# Patient Record
Sex: FEMALE | Race: WHITE | Employment: UNEMPLOYED | URBAN - METROPOLITAN AREA
[De-identification: names, ages, dates, MRNs, and addresses within clinical notes are randomized per-mention and may not be internally consistent; named-entity substitution may affect disease eponyms.]

---

## 2018-04-19 ENCOUNTER — OFFICE VISIT (OUTPATIENT)
Dept: FAMILY MEDICINE CLINIC | Age: 23
End: 2018-04-19

## 2018-04-19 VITALS
WEIGHT: 111 LBS | HEIGHT: 57 IN | BODY MASS INDEX: 23.95 KG/M2 | SYSTOLIC BLOOD PRESSURE: 108 MMHG | DIASTOLIC BLOOD PRESSURE: 56 MMHG | HEART RATE: 60 BPM | TEMPERATURE: 98.5 F

## 2018-04-19 DIAGNOSIS — R07.89 COSTOCHONDRAL CHEST PAIN: ICD-10-CM

## 2018-04-19 DIAGNOSIS — Z13.9 ENCOUNTER FOR SCREENING: Primary | ICD-10-CM

## 2018-04-19 LAB
GLUCOSE POC: NORMAL MG/DL
HGB BLD-MCNC: 13.2 G/DL

## 2018-04-19 NOTE — PATIENT INSTRUCTIONS
Costochondritis: Care Instructions  Your Care Instructions  You have chest pain because the cartilage of your rib cage is inflamed. This problem is called costochondritis. This type of chest wall pain may last from days to weeks. It is not a heart problem. Sometimes costochondritis occurs with a cold or the flu, and other times the exact cause is not known. Follow-up care is a key part of your treatment and safety. Be sure to make and go to all appointments, and call your doctor if you are having problems. It's also a good idea to know your test results and keep a list of the medicines you take. How can you care for yourself at home? · Take medicines for pain and inflammation exactly as directed. ¨ If the doctor gave you a prescription medicine, take it as prescribed. ¨ If you are not taking a prescription pain medicine, ask your doctor if you can take an over-the-counter medicine. ¨ Do not take two or more pain medicines at the same time unless the doctor told you to. Many pain medicines have acetaminophen, which is Tylenol. Too much acetaminophen (Tylenol) can be harmful. · It may help to use a warm compress or heating pad (set on low) on your chest. You can also try alternating heat and ice. Put ice or a cold pack on the area for 10 to 20 minutes at a time. Put a thin cloth between the ice and your skin. · Avoid any activity that strains the chest area. As your pain gets better, you can slowly return to your normal activities. · Do not use tape, an elastic bandage, a \"rib belt,\" or anything else that restricts your chest wall motion. When should you call for help? Call 911 anytime you think you may need emergency care. For example, call if:  ? · You have new or different chest pain or pressure. This may occur with:  ¨ Sweating. ¨ Shortness of breath. ¨ Nausea or vomiting. ¨ Pain that spreads from the chest to the neck, jaw, or one or both shoulders or arms. ¨ Dizziness or lightheadedness.   ¨ A fast or uneven pulse. After calling 911, chew 1 adult-strength aspirin. Wait for an ambulance. Do not try to drive yourself. ? · You have severe trouble breathing. ?Call your doctor now or seek immediate medical care if:  ? · You have a fever or cough. ? · You have any trouble breathing. ? · Your chest pain gets worse. ? Watch closely for changes in your health, and be sure to contact your doctor if:  ? · Your chest pain continues even though you are taking anti-inflammatory medicine. ? · Your chest wall pain has not improved after 5 to 7 days. Where can you learn more? Go to http://jeb-ryan.info/. Enter K554 in the search box to learn more about \"Costochondritis: Care Instructions. \"  Current as of: March 20, 2017  Content Version: 11.4  © 8222-5283 skedge.me. Care instructions adapted under license by [employer] (which disclaims liability or warranty for this information). If you have questions about a medical condition or this instruction, always ask your healthcare professional. [place] [address] any warranty or liability for your use of this information. Costocondritis: Instrucciones de cuidado - [ Costochondritis: Care Instructions ]  Instrucciones de cuidado  Usted tiene dolor en el pecho porque el cartílago de saucedo caja torácica está inflamado. Emily problema se llama costocondritis. Emily tipo de dolor de la pared torácica puede durar desde varios días a semanas. No es un problema cardíaco. A veces la costocondritis ocurre con un resfriado o la gripe, y otras veces no se conoce la causa exacta. La atención de seguimiento es wesley parte clave de saucedo tratamiento y seguridad. Asegúrese de hacer y acudir a todas las citas, y llame a saucedo médico si está teniendo problemas. También es wesley buena idea saber los resultados de los exámenes y mantener wesley lista de los medicamentos que ele. Cómo puede cuidarse en el hogar?   · [employer] medicamentos para el dolor y la inflamación exactamente nicolas le fueron indicados. ¨ Si el médico le recetó un medicamento, tómelo según las indicaciones. ¨ Si no está tomando un analgésico (medicamento para el dolor) recetado, pregúntele a saucedo médico si puede мария rosalie de [employer]. ¨ No tome dos o más analgésicos al [employer], a menos que el médico se lo haya indicado. Muchos analgésicos contienen acetaminofén, es decir, Tylenol. El exceso de acetaminofén (Tylenol) puede ser dañino. · Usar welsey compresa tibia o wesley almohadilla térmica (a temperatura baja) sobre el pecho puede ayudar. También puede tratar de [place] [employer] calor y hielo. Colóquese hielo o wesley compresa fría en la mila denita 10 a 20 minutos cada vez. Póngase un paño huff entre el hielo y la piel. · Evite cualquier actividad en la que tenga que esforzar la mila del pecho. A medida que saucedo dolor mejore, puede volver poco a poco a terry actividades normales. · No use cinta adhesiva, un vendaje elástico, un \"cinturón para las costillas\", ni ninguna otra cosa que restrinja el movimiento de la pared torácica. Cuándo debe pedir ayuda? Llame al 911 en cualquier momento que considere que necesita atención de emergencia. Por ejemplo, llame si:  ? · Siente nuevo o diferente dolor u opresión en el pecho. [place] podría ocurrir junto con:  ¨ Sudoración. ¨ Falta de aire. ¨ Náuseas o vómito. ¨ Dolor que se extiende del pecho al adriana, la [place], o hacia rosalie o ambos hombros o [id number]. ¨ Mareos o aturdimiento. ¨ Pulso rápido o irregular. Después de llamar al 911, mastique 1 aspirina para adultos. Espere a la ambulancia. No trate de conducir usted mismo un automóvil. ? · [address] dificultades para respirar. ? Llame a saucedo médico ahora mismo o busque atención médica inmediata si:  ? · Tiene fiebre o tos. ? · Tiene cualquier dificultad para respirar. ? · El dolor en el pecho empeora.    ?Preste especial atención a los [facility] y asegúrese de comunicarse con saucedo médico si:  ? · El dolor de pecho continúa aunque esté tomando medicamentos antiinflamatorios. ? · El dolor de la pared torácica no mcdonald carline después de 5 a 7 días. Dónde puede encontrar más información en inglés? [place] a http://jeb-ryan.info/. Shon Spotted V884 en la búsqueda para aprender más acerca de \"Costocondritis: Instrucciones de cuidado - [ Costochondritis: Care Instructions ]. \"  Revisado: 20 Raghu Duarte 2017  Versión del contenido: 11.4  © 3588-2806 Healthwise, Incorporated. Las instrucciones de cuidado fueron adaptadas bajo licencia por [employer] (which disclaims liability or warranty for this information). Si usted tiene [place] afección médica o sobre estas instrucciones, siempre pregunte a saucedo profesional de rachid. Healthwise, Incorporated niega toda garantía o responsabilidad por saucedo uso de esta información.

## 2018-04-19 NOTE — PROGRESS NOTES
Results for orders placed or performed in visit on 04/19/18   AMB POC GLUCOSE BLOOD, BY GLUCOSE MONITORING DEVICE   Result Value Ref Range    Glucose POC NF 71 mg/dL   AMB POC HEMOGLOBIN (HGB)   Result Value Ref Range    Hemoglobin (POC) 13.2

## 2021-03-24 ENCOUNTER — HOSPITAL ENCOUNTER (EMERGENCY)
Age: 26
Discharge: HOME | End: 2021-03-24
Attending: EMERGENCY MEDICINE

## 2021-03-24 ENCOUNTER — VIRTUAL VISIT (OUTPATIENT)
Dept: FAMILY MEDICINE CLINIC | Age: 26
End: 2021-03-24

## 2021-03-24 VITALS
OXYGEN SATURATION: 97 % | BODY MASS INDEX: 26.16 KG/M2 | WEIGHT: 121.25 LBS | DIASTOLIC BLOOD PRESSURE: 70 MMHG | RESPIRATION RATE: 18 BRPM | HEART RATE: 98 BPM | TEMPERATURE: 98.4 F | HEIGHT: 57 IN | SYSTOLIC BLOOD PRESSURE: 121 MMHG

## 2021-03-24 DIAGNOSIS — L05.91 INFECTED PILONIDAL CYST: Primary | ICD-10-CM

## 2021-03-24 DIAGNOSIS — Z71.9 COUNSELED BY NURSE: Primary | ICD-10-CM

## 2021-03-24 PROCEDURE — 75810000462 HC INC/DRN PILONIDAL CYST SIMPLE LVL 1 5051

## 2021-03-24 PROCEDURE — 99284 EMERGENCY DEPT VISIT MOD MDM: CPT

## 2021-03-24 PROCEDURE — 99283 EMERGENCY DEPT VISIT LOW MDM: CPT

## 2021-03-24 PROCEDURE — 74011000250 HC RX REV CODE- 250: Performed by: EMERGENCY MEDICINE

## 2021-03-24 RX ORDER — LIDOCAINE HYDROCHLORIDE AND EPINEPHRINE 10; 10 MG/ML; UG/ML
1.5 INJECTION, SOLUTION INFILTRATION; PERINEURAL ONCE
Status: COMPLETED | OUTPATIENT
Start: 2021-03-24 | End: 2021-03-24

## 2021-03-24 RX ADMIN — LIDOCAINE HYDROCHLORIDE,EPINEPHRINE BITARTRATE 15 MG: 10; .01 INJECTION, SOLUTION INFILTRATION; PERINEURAL at 20:48

## 2021-03-24 NOTE — ED TRIAGE NOTES
Patient presents to ED for c/o back pain x 1 week. Denies any injury or radiation of pain. Has taken antibiotics (ampicillin) yesterday to help with the pain. Reports previous operation on back approx 6 years ago. (Per , believes pt is referring to pilonidal cyst?)     Information obtained via  #[id number].

## 2021-03-24 NOTE — PROGRESS NOTES
Evelio Herzog assisted with the intake. 6 months a go took medication for the pain. The pain went away. Saw a Dr last Monday. - Dr Margarito Nugent? ? Does not know where this is. It is like an urgent care. She got medicine that is an antibiotic and Amoxicillin/ ?? Is 875mg / 175 mg? [place]. She stated she only took the medication last nght and this morning at 6am. Was not feeling better was feeling worse so she stopped taking it. The pt stated she can not walk and sit and is getting worse. She is feeling bad. The pt then told the nurse that the other dr she had seen that gave he rthe antibiotic had told her if the medication dd not work and if she got worse she would need to go to the hospital because she probably would need surgery. The pt was told then that is what she should have done. Because this is a VV APPT the pt will need to be seen in person. The pt was advised to go to the Hospital now. The pt was given [facility] address and told about the Care Card application process for FA. The provider was notified that this nurse advised th ept to go to the ED. The provider did not speak with the pt tonight. No VV appt was done with the provider.   Diaz Williamson RN

## 2021-03-25 ENCOUNTER — TELEPHONE (OUTPATIENT)
Dept: FAMILY MEDICINE CLINIC | Age: 26
End: 2021-03-25

## 2021-03-25 NOTE — ED PROVIDER NOTES
81-year-old female with a history of a pilonidal cyst presents with a chief complaint of low back pain in the area of a recurrent pilonidal cyst.  The patient has had surgery to remove a pilonidal cyst in [facility] several years ago. She has not had any fevers. She has been on antibiotic recently which has not helped. She denies any other symptoms. A  was used. Past Medical History:   Diagnosis Date    Ovarian cyst     right side       No past surgical history on file.       Family History:   Problem Relation Age of Onset    Diabetes Father        Social History     Socioeconomic History    Marital status: SINGLE     Spouse name: Not on file    Number of children: Not on file    Years of education: Not on file    Highest education level: Not on file   Occupational History    Not on file   Social Needs    Financial resource strain: Not on file    Food insecurity     Worry: Not on file     Inability: Not on file    Transportation needs     Medical: Not on file     Non-medical: Not on file   Tobacco Use    Smoking status: Never Smoker    Smokeless tobacco: Never Used   Substance and Sexual Activity    Alcohol use: No    Drug use: No    Sexual activity: Not Currently     Partners: Male     Birth control/protection: Implant   Lifestyle    Physical activity     Days per week: Not on file     Minutes per session: Not on file    Stress: Not on file   Relationships    Social connections     Talks on phone: Not on file     Gets together: Not on file     Attends [employer] service: Not on file     Active member of club or organization: Not on file     Attends meetings of clubs or organizations: Not on file     Relationship status: Not on file    Intimate partner violence     Fear of current or ex partner: Not on file     Emotionally abused: Not on file     Physically abused: Not on file     Forced sexual activity: Not on file   Other Topics Concern    Not on file   Social History Narrative    Not on file         ALLERGIES: Patient has no known allergies. Review of Systems   Constitutional: Negative for fever. HENT: Negative for rhinorrhea. Respiratory: Negative for shortness of breath. Cardiovascular: Negative for chest pain. Gastrointestinal: Negative for abdominal pain. Genitourinary: Negative for dysuria. Musculoskeletal: Negative for back pain. Skin: Positive for wound. Neurological: Negative for headaches. Psychiatric/Behavioral: Negative for confusion. Vitals:    03/24/21 1918 03/24/21 2130 03/24/21 2130   BP: 121/70     Pulse: (!) 114 98    Resp: 18 18    Temp: 98.4 °F (36.9 °C)     SpO2: 97% 98% 97%   Weight: 55 kg (121 lb 4.1 oz)     Height: 4' 9.09\" (1.45 m)              Physical Exam  Vitals signs and nursing note reviewed. Constitutional:       General: She is not in acute distress. Appearance: Normal appearance. She is not ill-appearing, toxic-appearing or diaphoretic. HENT:      Head: Normocephalic and atraumatic. Eyes:      Extraocular Movements: Extraocular movements intact. Neck:      Musculoskeletal: Normal range of motion. Cardiovascular:      Rate and Rhythm: Normal rate and regular rhythm. Pulses: Normal pulses. Heart sounds: Normal heart sounds. No murmur. No friction rub. No gallop. Pulmonary:      Effort: Pulmonary effort is normal. No respiratory distress. Breath sounds: Normal breath sounds. No wheezing or rales. Abdominal:      General: Abdomen is flat. Bowel sounds are normal. There is no distension. Palpations: Abdomen is soft. Tenderness: There is no abdominal tenderness. Genitourinary:     Comments: Pilonidal scar and pilonidal cyst with mild surrounding erythema. Musculoskeletal: Normal range of motion. Skin:     General: Skin is warm and dry. Neurological:      Mental Status: She is alert and oriented to person, place, and time.    Psychiatric:         Mood and Affect: Mood normal.          MDM  Number of Diagnoses or Management Options  Infected pilonidal cyst  Diagnosis management comments: Patient presents with a pilonidal cyst.   was used for the history and physical exam.  I did consent her with an  for a pilonidal cyst drainage as I do feel that she likely has an infected pilonidal cyst.  She has had surgery for this in the past and I will refer her to surgery here in the [address]. The cyst was drained per procedure note below. I see no indication for antibiotics as she has no evidence of surrounding cellulitis. Patient is comfortable and agreeable to plan of care and aware of return precautions. I&D Abcess Complex    Date/Time: 3/24/2021 11:49 PM  Performed by: Dimitrios Ritchie MD  Authorized by: Dimitrios Ritchie MD     Consent:     Consent obtained:  Verbal    Consent given by:  Patient    Risks discussed:  Bleeding, incomplete drainage, infection and pain    Alternatives discussed:  Alternative treatment  Location:     Type:  Pilonidal cyst  Pre-procedure details:     Skin preparation:  Antiseptic wash  Anesthesia (see MAR for exact dosages): Anesthesia method:  Local infiltration    Local anesthetic:  Lidocaine 1% WITH epi  Procedure type:     Complexity:  Complex  Procedure details:     Needle aspiration: no      Incision types:  Single straight    Incision depth:  Submucosal    Scalpel blade:  11    Wound management:  Probed and deloculated, irrigated with saline and extensive cleaning    Drainage:  Bloody and purulent    Drainage amount: Moderate    Wound treatment:  Wound left open    Packing materials:  None  Post-procedure details:     Patient tolerance of procedure: Tolerated well, no immediate complications  Comments:      Procedure was performed under ultrasound guidance.

## 2021-03-25 NOTE — TELEPHONE ENCOUNTER
Tc from the pt she had been on the schedule for the provider in last nights VV clinic. While doing intake on the pt the pt had stated she wa shaving trouble sitting and walking and lying down from the area of her back that she had a cyst removed approx 6 yrs ago. She has been to the Dr Monday and he gave her antibiotics and told her if she does not improve or gets worse she needed to go to the ED. The pt was told that she should do exactly that then. The appt today is VV and the provider if she waited for him to call would tell her to just go to the ED. The pt was advised to go to ED and to call this nurse back the ext day so we could schedule her an appt with the provider in case she is in need of surgery or needs further care. The pt went to the ED, and called this nurse this am. The pt stated they lanced the cyst and drained it and told her she would need to see a surgeon ASAP. The pt was told someone would call her back with an follow up post ED appt soon. The pt verbalized understanding. A message was sent to the front office to please schedule the pt for the Post ED appt.  Sunita Boykin RN

## 2021-03-29 ENCOUNTER — OFFICE VISIT (OUTPATIENT)
Dept: FAMILY MEDICINE CLINIC | Age: 26
End: 2021-03-29

## 2021-03-29 VITALS
HEART RATE: 66 BPM | BODY MASS INDEX: 26.27 KG/M2 | HEIGHT: 56 IN | TEMPERATURE: 98.2 F | DIASTOLIC BLOOD PRESSURE: 68 MMHG | WEIGHT: 116.8 LBS | OXYGEN SATURATION: 98 % | SYSTOLIC BLOOD PRESSURE: 100 MMHG

## 2021-03-29 DIAGNOSIS — L05.91 PILONIDAL CYST: Primary | ICD-10-CM

## 2021-03-29 DIAGNOSIS — Z59.9 ECONOMIC PROBLEM: ICD-10-CM

## 2021-03-29 PROCEDURE — 99213 OFFICE O/P EST LOW 20 MIN: CPT | Performed by: FAMILY MEDICINE

## 2021-03-29 SDOH — ECONOMIC STABILITY - INCOME SECURITY: PROBLEM RELATED TO HOUSING AND ECONOMIC CIRCUMSTANCES, UNSPECIFIED: Z59.9

## 2021-03-29 NOTE — PROGRESS NOTES
I called patient today with [facility] [employer]  # [phone] as . I have printed AVS and reviewed it with patient today. Explained to patient  that Jesus Ernandez or Leanne South will be meeting with them to complete application for Access Now referral to see general surgery. They know that they will get a phone call to schedule this appointment. I gave patient an application for financial assistance as she has concerns/questions about her ED visit and bills. I have asked patient to also explain to Vestor when they call that she has questions about this process as well. Dillan Bedolla RN

## 2021-03-29 NOTE — PROGRESS NOTES
HISTORY OF PRESENT ILLNESS  Leanne Gutierrez is a 26 y.o. female. HPI  Patient states she went to the hospital and was diagnosed with infected pilonidal cyst.  She had it treated in 2015 in [facility].  She feels better now, she was advise to get an appointment with general surgery to have definite treatment of the cyst.  Review of Systems   Constitutional: Negative for chills, fever and weight loss. Respiratory: Negative for cough and hemoptysis. Cardiovascular: Negative for chest pain, palpitations and orthopnea. Gastrointestinal: Negative for diarrhea, heartburn and nausea. Skin:        Skin lesions sacral area   /68 (BP 1 Location: Left arm, BP Patient Position: Sitting)   Pulse 66   Temp 98.2 °F (36.8 °C) (Temporal)   Ht 4' 8.22\" (1.428 m)   Wt 116 lb 12.8 oz (53 kg)   LMP 03/24/2021 (Exact Date)   SpO2 98%   BMI 25.98 kg/m²   Physical Exam  Constitutional:       General: She is not in acute distress. HENT:      Right Ear: Tympanic membrane normal.      Left Ear: Tympanic membrane normal.      Nose: Nose normal. No congestion. Mouth/Throat:      Mouth: Mucous membranes are moist.      Pharynx: No oropharyngeal exudate or posterior oropharyngeal erythema. Eyes:      General:         Right eye: No discharge. Left eye: No discharge. Pupils: Pupils are equal, round, and reactive to light. Neck:      Musculoskeletal: Normal range of motion. No neck rigidity. Cardiovascular:      Rate and Rhythm: Normal rate and regular rhythm. Pulses: Normal pulses. Heart sounds: No murmur. Pulmonary:      Effort: Pulmonary effort is normal. No respiratory distress. Breath sounds: Normal breath sounds. No wheezing or rhonchi. Abdominal:      General: Bowel sounds are normal. There is no distension. Palpations: Abdomen is soft. Tenderness: There is no abdominal tenderness. Hernia: No hernia is present. Musculoskeletal: Normal range of motion. General: No swelling or tenderness. Skin:     Comments: There is a 2 cm palpable lump, sacral area   Neurological:      General: No focal deficit present. Mental Status: She is alert. ASSESSMENT and PLAN  Diagnoses and all orders for this visit:    1.  Pilonidal cyst  -     REFERRAL TO GENERAL SURGERY    2. Economic problem  -     REFERRAL TO SOCIAL WORK      26year old with a pilonidal cyst, recently abscessed, I+D in the ED, we will refer to general surgery for definite treatment  Follow up as needed

## 2021-03-29 NOTE — PROGRESS NOTES
Coordination of Care  1. Have you been to the ER, urgent care clinic since your last visit? Hospitalized since your last visit? Yes When: 3/24/21- [address]    2. Have you seen or consulted any other health care providers outside of the [address] [place] [address] since your last visit? Include any pap smears or colon screening. No    Does the patient need refills? NO    Learning Assessment Complete?  yes  Depression Screening complete in the past 12 months? yes

## 2021-04-02 ENCOUNTER — TELEPHONE (OUTPATIENT)
Dept: FAMILY MEDICINE CLINIC | Age: 26
End: 2021-04-02

## 2021-04-02 ENCOUNTER — OFFICE VISIT (OUTPATIENT)
Dept: FAMILY MEDICINE CLINIC | Age: 26
End: 2021-04-02

## 2021-04-02 DIAGNOSIS — Z71.89 COUNSELING AND COORDINATION OF CARE: Primary | ICD-10-CM

## 2021-04-02 PROCEDURE — 99080 SPECIAL REPORTS OR FORMS: CPT | Performed by: PHYSICIAN ASSISTANT

## 2021-04-02 NOTE — PROGRESS NOTES
V.V. Patient called OW after receiving vm. Financial screening started. An appt was made for 4/16/21 at 1:45pm. OW screened patient for Yuliana Shelley and she replied NO to all questions. Pending POI.

## 2021-04-02 NOTE — TELEPHONE ENCOUNTER
OW called patient to assist with AN financial screening. Unable to speak with patient, OW left a vm asking patient to call back.

## 2021-04-16 ENCOUNTER — OFFICE VISIT (OUTPATIENT)
Dept: FAMILY MEDICINE CLINIC | Age: 26
End: 2021-04-16

## 2021-04-16 DIAGNOSIS — Z71.89 COUNSELING AND COORDINATION OF CARE: Primary | ICD-10-CM

## 2021-04-16 PROCEDURE — 99080 SPECIAL REPORTS OR FORMS: CPT | Performed by: PHYSICIAN ASSISTANT

## 2021-04-16 NOTE — PROGRESS NOTES
OW met patient. Patient signed forms. AN application was completed. OW instructed patient to call AN on or after 4/30/21. Patient also needs assistance with bills.  An appt was made for next 4/22/21 at 1:45pm.

## 2021-04-22 ENCOUNTER — OFFICE VISIT (OUTPATIENT)
Dept: FAMILY MEDICINE CLINIC | Age: 26
End: 2021-04-22

## 2021-04-22 DIAGNOSIS — Z71.89 COUNSELING AND COORDINATION OF CARE: Primary | ICD-10-CM

## 2021-04-22 PROCEDURE — 99080 SPECIAL REPORTS OR FORMS: CPT | Performed by: PHYSICIAN ASSISTANT

## 2021-04-22 NOTE — PROGRESS NOTES
BANG met with patient and assisted her with Care Card application. It was completed. BANG mailed it to [facility] FA and provided written instructions in Icelandic to patient on what to do next. Patient verbalized understanding.

## 2021-05-10 ENCOUNTER — OFFICE VISIT (OUTPATIENT)
Dept: SURGERY | Age: 26
End: 2021-05-10
Payer: SUBSIDIZED

## 2021-05-10 VITALS
HEART RATE: 72 BPM | RESPIRATION RATE: 16 BRPM | DIASTOLIC BLOOD PRESSURE: 75 MMHG | SYSTOLIC BLOOD PRESSURE: 106 MMHG | TEMPERATURE: 98.9 F | OXYGEN SATURATION: 98 % | HEIGHT: 56 IN | BODY MASS INDEX: 26.99 KG/M2 | WEIGHT: 120 LBS

## 2021-05-10 DIAGNOSIS — Z98.890 HISTORY OF SURGICAL REMOVAL OF PILONIDAL CYST: Primary | ICD-10-CM

## 2021-05-10 PROCEDURE — 99202 OFFICE O/P NEW SF 15 MIN: CPT | Performed by: SURGERY

## 2021-05-10 RX ORDER — AMOXICILLIN AND CLAVULANATE POTASSIUM 875; 125 MG/1; MG/1
1 TABLET, FILM COATED ORAL EVERY 12 HOURS
Qty: 10 TAB | Refills: 0 | Status: SHIPPED | OUTPATIENT
Start: 2021-05-10 | End: 2021-05-15

## 2021-05-10 NOTE — PROGRESS NOTES
Shanna Robertson is a 26 y.o. female who is referred by Dr. Lily Rubi for further evaluation of a pilonidal abscess. Information obtained from patient via blue phone  and review of chart. Ms. Niraj Lynn tells me that she is s/p pilonidal cystectomy in [facility] many years ago. Doing well until March, 2021 when she began experiencing lower back pain. Seen in the ER where she was found to have a recurrent pilonidal abscess which was drained. No antibiotics were prescribed at that time. Ms. Niraj Lynn has been doing fairly well since then. Still c/o back pain. No fevers or chills. Nausea but no emesis. No drainage or bleeding from wound. She has otherwise been in her usual state of health. Past Medical History:   Diagnosis Date    History of surgical removal of pilonidal cyst 5/10/2021    Ovarian cyst     right side     History reviewed. No pertinent surgical history. Family History   Problem Relation Age of Onset    Diabetes Father      Social History     Socioeconomic History    Marital status: SINGLE     Spouse name: Not on file    Number of children: Not on file    Years of education: Not on file    Highest education level: Not on file   Tobacco Use    Smoking status: Never Smoker    Smokeless tobacco: Never Used   Substance and Sexual Activity    Alcohol use: Yes     Frequency: Monthly or less     Comment: occa    Drug use: No    Sexual activity: Not Currently     Partners: Male     Birth control/protection: Implant     Review of systems negative except as noted. Review of Systems   Constitutional: Negative for chills and fever. Gastrointestinal: Negative for nausea and vomiting. Musculoskeletal: Positive for back pain. Physical Exam  Vitals signs reviewed. Constitutional:       Appearance: Normal appearance. She is normal weight. HENT:      Head: Normocephalic and atraumatic. Eyes:      General: No scleral icterus. Neck:      Musculoskeletal: Neck supple. Cardiovascular:      Rate and Rhythm: Normal rate and regular rhythm. Pulmonary:      Effort: Pulmonary effort is normal.      Breath sounds: Normal breath sounds. Abdominal:      General: There is no distension. Palpations: Abdomen is soft. Tenderness: There is no abdominal tenderness. Musculoskeletal: Normal range of motion. Skin:     Comments: Well healed scar in intergluteal cleft. Tender. No associated purulent drainage, induration or cellulitis. No apparent recurrent pilonidal disease. Neurological:      General: No focal deficit present. Mental Status: She is alert. ASSESSMENT and PLAN  Ms. Jamison Rothman is a 27 yo female doing well following drainage of a pilonidal abscess. Reassured Ms. Jamison Rothman, via the blue phone , that she is doing well and that there is no apparent recurrent pilonidal abscess. Do not believe that there is an acute indication for surgical intervention at this time. Will try a 5 day course of Augmentin 875/125 q 12 hours for five days. Activity as tolerated. Will see in one more week or earlier if need be. Discussed plan with Ms. Jamison Rothman, via the blue phone , and she is agreeable.       CC: Kris Pereira MD

## 2021-05-10 NOTE — PROGRESS NOTES
1. Have you been to the ER, urgent care clinic since your last visit? Hospitalized since your last visit? No    2. Have you seen or consulted any other health care providers outside of the [address] [place] [address] since your last visit? Include any pap smears or colon screening.  No

## 2021-06-07 ENCOUNTER — OFFICE VISIT (OUTPATIENT)
Dept: SURGERY | Age: 26
End: 2021-06-07
Payer: SUBSIDIZED

## 2021-06-07 VITALS
OXYGEN SATURATION: 99 % | RESPIRATION RATE: 16 BRPM | HEIGHT: 56 IN | BODY MASS INDEX: 26.1 KG/M2 | SYSTOLIC BLOOD PRESSURE: 96 MMHG | DIASTOLIC BLOOD PRESSURE: 86 MMHG | TEMPERATURE: 98.1 F | WEIGHT: 116 LBS | HEART RATE: 71 BPM

## 2021-06-07 DIAGNOSIS — Z98.890 HISTORY OF SURGICAL REMOVAL OF PILONIDAL CYST: Primary | ICD-10-CM

## 2021-06-07 PROCEDURE — 99212 OFFICE O/P EST SF 10 MIN: CPT | Performed by: SURGERY

## 2021-06-07 NOTE — PROGRESS NOTES
Norman Marin is a 26 y.o. female who returns for follow up of a pilonidal abscess. Information obtained from patient via blue phone . Ms. Jass Chan was last seen on May 10, 2021 for evaluation of a pilonidal abscess. Doing fairly well since then. Still experiencing pain in intergluteal cleft. No associated drainage. No fevers or chills. No nausea or vomitting. She has otherwise been in her usual state of health. Past Medical History:   Diagnosis Date    History of surgical removal of pilonidal cyst 5/10/2021    Ovarian cyst     right side     History reviewed. No pertinent surgical history. Family History   Problem Relation Age of Onset    Diabetes Father      Social History     Socioeconomic History    Marital status: SINGLE     Spouse name: Not on file    Number of children: Not on file    Years of education: Not on file    Highest education level: Not on file   Tobacco Use    Smoking status: Never Smoker    Smokeless tobacco: Never Used   Substance and Sexual Activity    Alcohol use: Yes     Comment: occa    Drug use: No    Sexual activity: Not Currently     Partners: Male     Birth control/protection: Implant     Social Determinants of Health     Financial Resource Strain:     Difficulty of Paying Living Expenses:    Food Insecurity:     Worried About Running Out of Food in the Last Year:     [address] in the Last Year:    Transportation Needs:     Lack of Transportation (Medical):      Lack of Transportation (Non-Medical):    Physical Activity:     Days of Exercise per Week:     Minutes of Exercise per Session:    Stress:     Feeling of Stress :    Social Connections:     Frequency of Communication with Friends and Family:     Frequency of Social Gatherings with Friends and Family:     Attends Yazidi Services:     Active Member of Clubs or Organizations:     Attends Club or Organization Meetings:     Marital Status:      Review of systems negative except as noted. Review of Systems   Constitutional: Negative for chills and fever. Gastrointestinal: Negative for nausea and vomiting. Musculoskeletal:        Discomfort in intergluteal cleft. Physical Exam  Vitals reviewed. Constitutional:       General: She is not in acute distress. Appearance: Normal appearance. She is normal weight. HENT:      Head: Normocephalic and atraumatic. Cardiovascular:      Rate and Rhythm: Normal rate and regular rhythm. Pulmonary:      Effort: Pulmonary effort is normal.      Breath sounds: Normal breath sounds. Abdominal:      General: There is no distension. Palpations: Abdomen is soft. Tenderness: There is no abdominal tenderness. Musculoskeletal:         General: Normal range of motion. Skin:     Comments: Well healed scar in intergluteal cleft. No sinus openings in in intergluteal cleft. No drainage or associated cellulitis or induration. Neurological:      General: No focal deficit present. Mental Status: She is alert. ASSESSMENT and PLAN  Ms. Jamison Rothman is a 25 yo female s/p pilonidal cystectomy in the past with no apparent active infection or recurrent disease. Reassured Ms. Jamison Rothman, via the blue phone , that she is doing well and that at this point in time there is no apparent infection or recurrent disease. Do not believe that there is an acute indication for abx therapy or surgical intervention at this time. Activity as tolerated. Follow up with Dr. Court Frazier as scheduled. Will see in two more weeks or earlier if need be. Discussed plan with Ms. Jamison Rothman, via the blue phone , and she is agreeable.     CC: Kris Pereira MD

## 2021-06-07 NOTE — PROGRESS NOTES
1. Have you been to the ER, urgent care clinic since your last visit? Hospitalized since your last visit? No    2. Have you seen or consulted any other health care providers outside of the [address] [place] [address] since your last visit? Include any pap smears or colon screening.  No

## 2022-03-19 PROBLEM — Z98.890 HISTORY OF SURGICAL REMOVAL OF PILONIDAL CYST: Status: ACTIVE | Noted: 2021-05-10

## 2022-09-01 ENCOUNTER — TRANSCRIBE ORDER (OUTPATIENT)
Dept: SCHEDULING | Age: 27
End: 2022-09-01

## 2022-09-01 DIAGNOSIS — R10.2 PELVIC PAIN IN FEMALE: Primary | ICD-10-CM

## 2022-09-08 ENCOUNTER — HOSPITAL ENCOUNTER (OUTPATIENT)
Dept: ULTRASOUND IMAGING | Age: 27
Discharge: HOME | End: 2022-09-08

## 2022-09-08 DIAGNOSIS — R10.2 PELVIC PAIN IN FEMALE: ICD-10-CM

## 2022-09-08 PROCEDURE — 76856 US EXAM PELVIC COMPLETE: CPT

## 2022-09-08 PROCEDURE — 76830 TRANSVAGINAL US NON-OB: CPT

## 2023-04-21 DIAGNOSIS — R10.2 PELVIC PAIN IN FEMALE: Primary | ICD-10-CM

## 2023-10-12 ENCOUNTER — TELEPHONE (OUTPATIENT)
Age: 28
End: 2023-10-12

## 2023-10-12 NOTE — TELEPHONE ENCOUNTER
----- Message from Delmis Kraft sent at 10/12/2023 10:43 AM EDT -----  Subject: Message to Provider    QUESTIONS  Information for Provider? Dana from the Prenatal center states that the   prt had a appt at [facility] at 8:45 missed appt offered to reschedule pt   declined   ---------------------------------------------------------------------------  --------------  Charles Manchester Braulio  [phone]; OK to leave message on voicemail  ---------------------------------------------------------------------------  --------------  SCRIPT ANSWERS  Relationship to Patient? Covered Entity  Covered Entity Type? Hospital?  Representative Name?  [employer]

## 2023-10-13 ENCOUNTER — ROUTINE PRENATAL (OUTPATIENT)
Age: 28
End: 2023-10-13
Payer: MEDICAID

## 2023-10-13 VITALS
SYSTOLIC BLOOD PRESSURE: 102 MMHG | WEIGHT: 143 LBS | OXYGEN SATURATION: 100 % | BODY MASS INDEX: 30.85 KG/M2 | DIASTOLIC BLOOD PRESSURE: 68 MMHG | HEART RATE: 82 BPM | TEMPERATURE: 97.9 F

## 2023-10-13 DIAGNOSIS — O09.90 SUPERVISION OF HIGH RISK PREGNANCY, ANTEPARTUM: Primary | ICD-10-CM

## 2023-10-13 DIAGNOSIS — O99.019 ANTEPARTUM ANEMIA: ICD-10-CM

## 2023-10-13 PROCEDURE — PBSHW TDAP, BOOSTRIX, (AGE 10 YRS+), IM: Performed by: FAMILY MEDICINE

## 2023-10-13 PROCEDURE — 90715 TDAP VACCINE 7 YRS/> IM: CPT | Performed by: FAMILY MEDICINE

## 2023-10-13 PROCEDURE — 0502F SUBSEQUENT PRENATAL CARE: CPT | Performed by: FAMILY MEDICINE

## 2023-10-13 RX ORDER — FERROUS SULFATE 325(65) MG
325 TABLET ORAL EVERY OTHER DAY
Qty: 30 TABLET | Refills: 1 | Status: SHIPPED | OUTPATIENT
Start: 2023-10-13

## 2023-10-13 NOTE — PROGRESS NOTES
Chief Complaint   Patient presents with    Routine Prenatal Visit     C/o back pain      Spine pain - sciatica before pregnancy but no pain down legs now, no history of scoliosis, hard to twist sideways  hard to sit, sometimes feels like going to fall, no pain in gluteal region, pain with walking, changing positions  when pain is really strong hard to walk     27yo  at 31w5d     IUP: Rh pos  low risk NIPT, negative CS  GTT okay  Tdap today  called to reschedule MFM scan   Late to Care: 3rd trimester   Anemia: HgB 8.5, f/up labs today (ordered previously by Dr. Saturnino Frankel), continue iron, recommend   Anti-De Ab: not associated with HDFN  Spinal/Flank Pain: reviewed exercises     Estimated Date of Delivery: 23

## 2023-10-16 ENCOUNTER — ROUTINE PRENATAL (OUTPATIENT)
Age: 28
End: 2023-10-16
Payer: MEDICAID

## 2023-10-16 VITALS — DIASTOLIC BLOOD PRESSURE: 66 MMHG | SYSTOLIC BLOOD PRESSURE: 102 MMHG | HEART RATE: 89 BPM

## 2023-10-16 DIAGNOSIS — Z3A.32 32 WEEKS GESTATION OF PREGNANCY: Primary | ICD-10-CM

## 2023-10-16 PROCEDURE — 99213 OFFICE O/P EST LOW 20 MIN: CPT | Performed by: OBSTETRICS & GYNECOLOGY

## 2023-10-16 PROCEDURE — 76805 OB US >/= 14 WKS SNGL FETUS: CPT | Performed by: OBSTETRICS & GYNECOLOGY

## 2023-10-16 NOTE — PROCEDURES
PATIENT: Dwight Pickering   -  : 1995   -  DOS:10/16/2023   -  INTERPRETING PROVIDER:Kristyn Sawant,   Indication  ========    Anatomy scan late gestational age    Method  ======    Transabdominal ultrasound examination. View: Suboptimal view: limited by fetal position. Suboptimal view: limited by late gestational age    Dating  ======    LMP on: 3/24/2023  Cycle: regular cycle  GA by LMP 29 w + 3 d  MARY by LMP: 2023  GA by prior assessment 32 w + 1 d  MARY by prior assessment: 12/10/2023  Ultrasound examination on: 10/16/2023  GA by U/S based upon: [facility], BPD, Femur, HC  GA by U/S 28 w + 2 d  MARY by U/S: 2023  Assigned: based on the LMP, selected on 10/16/2023  Assigned GA 29 w + 3 d  Assigned MARY: 2023    Fetal Growth Overview  =================    Exam date        GA              BPD (mm)          HC (mm)              AC (mm)                 FL (mm)             HL (mm)            EFW (g)  10/16/2023        29w 3d        79.6     96%        291.5    89%        288.4     >99%        61.3    92%        53.4     87%        1964    >99%    Fetal Biometry  ============    Standard  BPD 79.6 mm 32w 0d 96% Hadlock  .6 mm 33w 6d >99% Vikash  .5 mm 32w 1d 89% Hadlock  Cerebellum tr 39.3 mm 31w 5d 98% Hill  .4 mm 32w 6d >99% Hadlock  Femur 61.3 mm 31w 6d 92% Hadlock  Humerus 53.4 mm 31w 1d 87% Vikash  EFW 1,964 g 32w 0d >99% Hadlock  EFW (lb) 4 lb  EFW (oz) 5 oz  EFW by: Hadlock (BPD-HC-AC-FL)  Extended   5.9 mm  CM 6.3 mm  33% Nicolaides  Head / Face / Neck  Nasal bone: present  Other Structures   bpm    General Evaluation  ==============    Cardiac activity present.  bpm. Fetal movements: visualized. Presentation: Breech  Placenta: Placental site: fundal right  Umbilical cord: Cord vessels: 3 vessel cord. Insertion site: placental insertion NOT VISUALIZED  Amniotic fluid: Amount of AF: normal. MVP 6.7 cm. MARTINEZ 20.4 cm.  Q1 4.2 cm, Q2 4.0 cm, Q3 5.5 cm, Q4 6.7

## 2023-10-26 ENCOUNTER — ROUTINE PRENATAL (OUTPATIENT)
Age: 28
End: 2023-10-26

## 2023-10-26 VITALS
HEART RATE: 86 BPM | RESPIRATION RATE: 18 BRPM | WEIGHT: 145 LBS | HEIGHT: 57 IN | TEMPERATURE: 98.1 F | OXYGEN SATURATION: 97 % | DIASTOLIC BLOOD PRESSURE: 58 MMHG | SYSTOLIC BLOOD PRESSURE: 95 MMHG | BODY MASS INDEX: 31.28 KG/M2

## 2023-10-26 DIAGNOSIS — O36.63X0 EXCESSIVE FETAL GROWTH AFFECTING MANAGEMENT OF PREGNANCY IN THIRD TRIMESTER, SINGLE OR UNSPECIFIED FETUS: ICD-10-CM

## 2023-10-26 DIAGNOSIS — D50.9 MICROCYTIC ANEMIA: ICD-10-CM

## 2023-10-26 DIAGNOSIS — O99.019 ANEMIA DURING PREGNANCY: ICD-10-CM

## 2023-10-26 DIAGNOSIS — O09.90 SUPERVISION OF HIGH RISK PREGNANCY, ANTEPARTUM: Primary | ICD-10-CM

## 2023-10-26 PROCEDURE — 0502F SUBSEQUENT PRENATAL CARE: CPT | Performed by: FAMILY MEDICINE

## 2023-10-26 NOTE — PROGRESS NOTES
Chief Complaint   Patient presents with    Routine Prenatal Visit     30w6d     No VB, ctx, LOF  normal FM  Energy okay   Back pain doing better     27yo  at 30w6d by     IUP: Rh pos  low risk NIPT, negative CS  GTT okay  s/p tdap and flu  anatomy okay, poor visualization of placental insertion, limited because of late GA  Late to Care: 3rd trimester  would plan for repeat 3rd T growth  Large for Dates: 29w3 - EFW >99%, AC >99%  repeat growth  at 9am [facility]  Poor Dating Criteria: ultrasound at 29w3   Anemia: HgB 8.5, f/up labs today (ordered previously by Dr. Ladarius Polo), continue iron, recommend   Anti-De Ab: not associated with HDFN  Spinal/Flank Pain: reviewed exercises, support belt to be ordered   Non-Immune Hepatitis B: will  on starting vaccine series     Baby: girl   Estimated Date of Delivery: 23

## 2023-10-27 LAB
ERYTHROCYTE [DISTWIDTH] IN BLOOD BY AUTOMATED COUNT: 21.6 % (ref 11.7–15.4)
FERRITIN SERPL-MCNC: 13 NG/ML (ref 15–150)
HCT VFR BLD AUTO: 27.7 % (ref 34–46.6)
HGB BLD-MCNC: 8.1 G/DL (ref 11.1–15.9)
IRON SATN MFR SERPL: 5 % (ref 15–55)
IRON SERPL-MCNC: 26 UG/DL (ref 27–159)
MCH RBC QN AUTO: 20.6 PG (ref 26.6–33)
MCHC RBC AUTO-ENTMCNC: 29.2 G/DL (ref 31.5–35.7)
MCV RBC AUTO: 70 FL (ref 79–97)
NRBC BLD AUTO-RTO: 1 % (ref 0–0)
PLATELET # BLD AUTO: 353 X10E3/UL (ref 150–450)
RBC # BLD AUTO: 3.94 X10E6/UL (ref 3.77–5.28)
TIBC SERPL-MCNC: 554 UG/DL (ref 250–450)
UIBC SERPL-MCNC: 528 UG/DL (ref 131–425)
WBC # BLD AUTO: 7.6 X10E3/UL (ref 3.4–10.8)

## 2023-10-30 LAB
HGB A MFR BLD ELPH: 98 % (ref 96.4–98.8)
HGB A2 MFR BLD ELPH: 2 % (ref 1.8–3.2)
HGB F MFR BLD ELPH: 0 % (ref 0–2)
HGB FRACT BLD-IMP: NORMAL
HGB S MFR BLD ELPH: 0 %

## 2023-10-31 LAB
IRON SATN MFR SERPL: 4 % SATURATION
IRON SERPL-MCNC: 26 UG/DL
SPECIMEN STATUS REPORT: NORMAL
TRANSFERRIN SERPL-MCNC: 467 MG/DL

## 2023-11-09 ENCOUNTER — TELEPHONE (OUTPATIENT)
Age: 28
End: 2023-11-09

## 2023-11-09 NOTE — TELEPHONE ENCOUNTER
Called patient because missed OB visit today. Left VM using . Will route message to try to reschedule missed OB visit.      [address] [facility]  11/9/2023

## 2023-11-09 NOTE — TELEPHONE ENCOUNTER
Per Dr. Solange Ellison, this writer attempted to contact pt to reschedule her OB visit with her. Pt did not answer phone,  left a voice message. Dr. Solange Ellison approved using a Procedure slot for tomorrow, if pt calls back.

## 2023-11-30 ENCOUNTER — ROUTINE PRENATAL (OUTPATIENT)
Age: 28
End: 2023-11-30
Payer: MEDICAID

## 2023-11-30 VITALS
RESPIRATION RATE: 18 BRPM | HEART RATE: 88 BPM | SYSTOLIC BLOOD PRESSURE: 106 MMHG | DIASTOLIC BLOOD PRESSURE: 71 MMHG | TEMPERATURE: 98.4 F | WEIGHT: 150 LBS | HEIGHT: 57 IN | BODY MASS INDEX: 32.36 KG/M2 | OXYGEN SATURATION: 98 %

## 2023-11-30 DIAGNOSIS — O26.899 VAGINAL DISCHARGE DURING PREGNANCY, ANTEPARTUM: ICD-10-CM

## 2023-11-30 DIAGNOSIS — N89.8 VAGINAL DISCHARGE DURING PREGNANCY, ANTEPARTUM: ICD-10-CM

## 2023-11-30 DIAGNOSIS — O09.90 SUPERVISION OF HIGH RISK PREGNANCY, ANTEPARTUM: Primary | ICD-10-CM

## 2023-11-30 DIAGNOSIS — O99.019 ANEMIA DURING PREGNANCY: ICD-10-CM

## 2023-11-30 LAB
BACTERIA, WET MOUNT, POC: NORMAL
CLUE CELLS, WET MOUNT, POC: NORMAL
HEMOGLOBIN, POC: 8.9 G/DL
RBC WET MOUNT, POC: NEGATIVE
TRICH, WET MOUNT, POC: NORMAL
WBC, WET MOUNT, POC: NORMAL
YEAST, WET MOUNT, POC: NORMAL

## 2023-11-30 PROCEDURE — 85018 HEMOGLOBIN: CPT | Performed by: FAMILY MEDICINE

## 2023-11-30 PROCEDURE — 87210 SMEAR WET MOUNT SALINE/INK: CPT | Performed by: FAMILY MEDICINE

## 2023-11-30 NOTE — PROGRESS NOTES
Chief Complaint   Patient presents with    Routine Prenatal Visit     35w6d     White/clear discharge increased, some itching  irregular contractions especially at night     26yo  at 35w6d by L/29    IUP: Rh pos  low risk NIPT, negative CS  GTT okay  s/p tdap and flu  anatomy okay, poor visualization of placental insertion, limited because of late GA  GBS next visit   Late to Care: 3rd trimester  repeat 3rd T growth (missed today, rescheduled)   Poo Dating Criteria  Large for Dates: 29w3 - EFW >99%, AC >99% (presumed to be dating issue - MARY by U/S )  repeat growth rescheduled  (missed earlier)  Poor Dating Criteria: ultrasound at 29w3   Anemia: HgB 8.5 > 8.1, on iron, recheck improved to 8.9   Anti-De Ab: not associated with HDFN  Spinal/Flank Pain: reviewed exercises, support belt   Non-Immune Hepatitis B: will  on starting vaccine series  Vaginal discharge: wet prep unremarkable    Baby: girl   Labor: sister with her  Feeding: breastfeeding then maybe formula  order breastpump   Family Planning: *  Estimated Date of Delivery: 23

## 2023-12-06 ENCOUNTER — TELEPHONE (OUTPATIENT)
Age: 28
End: 2023-12-06

## 2023-12-06 NOTE — TELEPHONE ENCOUNTER
----- Message from Barbara Christie MA sent at 2023 12:12 PM EST -----  Subject: Message to Provider    QUESTIONS  Information for Provider? Needs to cancel OB appts and schedule    appt. Baby girl born 12/3/23. Please call to schedule. # Needs Malaysian   . ---------------------------------------------------------------------------  --------------  Cony Byers MATTEO  [phone]; OK to leave message on voicemail  ---------------------------------------------------------------------------  --------------  SCRIPT ANSWERS  Relationship to Patient?  Self

## 2024-01-18 ENCOUNTER — OFFICE VISIT (OUTPATIENT)
Age: 29
End: 2024-01-18

## 2024-01-18 ENCOUNTER — TELEPHONE (OUTPATIENT)
Age: 29
End: 2024-01-18

## 2024-01-18 VITALS
RESPIRATION RATE: 16 BRPM | SYSTOLIC BLOOD PRESSURE: 126 MMHG | OXYGEN SATURATION: 98 % | BODY MASS INDEX: 29.77 KG/M2 | HEART RATE: 72 BPM | DIASTOLIC BLOOD PRESSURE: 78 MMHG | TEMPERATURE: 98 F | WEIGHT: 138 LBS | HEIGHT: 57 IN

## 2024-01-18 DIAGNOSIS — O99.019 ANTEPARTUM ANEMIA: ICD-10-CM

## 2024-01-18 DIAGNOSIS — D50.9 IRON DEFICIENCY ANEMIA, UNSPECIFIED IRON DEFICIENCY ANEMIA TYPE: ICD-10-CM

## 2024-01-18 RX ORDER — FERROUS SULFATE 325(65) MG
325 TABLET ORAL EVERY OTHER DAY
Qty: 30 TABLET | Refills: 1 | Status: SHIPPED | OUTPATIENT
Start: 2024-01-18

## 2024-01-18 ASSESSMENT — PATIENT HEALTH QUESTIONNAIRE - PHQ9
SUM OF ALL RESPONSES TO PHQ QUESTIONS 1-9: 0
1. LITTLE INTEREST OR PLEASURE IN DOING THINGS: 0
2. FEELING DOWN, DEPRESSED OR HOPELESS: 0
SUM OF ALL RESPONSES TO PHQ QUESTIONS 1-9: 0
SUM OF ALL RESPONSES TO PHQ9 QUESTIONS 1 & 2: 0

## 2024-01-18 NOTE — PROGRESS NOTES
Carolina Welch is a 28 y.o. female      Chief Complaint   Patient presents with    Postpartum Care     Patient is coming in for a postpartum follow up.  2023. She said she has some burning on her wound. She said she does have some redness. No other concerns.        1. Have you been to the ER, urgent care clinic since your last visit?  Hospitalized since your last visit?No    2. Have you seen or consulted any other health care providers outside of the [facility] since your last visit?  Include any pap smears or colon screening. no    Vitals:    24 1116   BP: 126/78   Site: Right Upper Arm   Position: Sitting   Pulse: 72   Resp: 16   Temp: 98 °F (36.7 °C)   TempSrc: Oral   SpO2: 98%   Weight: 62.6 kg (138 lb)   Height: 1.448 m (4' 9\")            Health Maintenance Due   Topic Date Due    Hepatitis B vaccine (1 of 3 - 3-dose series) Never done    COVID-19 Vaccine (1) Never done    Varicella vaccine (1 of 2 - 2-dose childhood series) Never done         Medication Reconciliation completed, changes noted.  Please  Update medication list.

## 2024-01-18 NOTE — PROGRESS NOTES
[facility] Medicine Office Visit     Assessment/ Plan: Carolina Welch is a 28 y.o. female presenting for:    Postpartum Visit - Doing well.   -- plan for family planning: condoms, not currently sexually active, counseled on pregnancy spacing  -- EPDS 7, feels good support  -- returning for pap smear   -- due to history of iron-deficiency anemia and postpartum transfusion, will recheck H/H, sent in refill for iron     20 minutes were spent on the day of this encounter both with the patient and in related activities including chart review, care coordination and counseling.     Patient instructions were discussed and/or provided in the AVS. The patient understands and agrees to the plan.    RETURN TO CARE:   Future Appointments   Date Time Provider Department Center   2024  9:40 AM Kenyetta Dixon, DO [facility]         Subjective:  Chief Complaint   Patient presents with    Postpartum Care     Patient is coming in for a postpartum follow up.  2023. She said she has some burning on her wound. She said she does have some redness. No other concerns.      HPI: Carolina Welch is a 28 y.o.  who is 6-weeks postpartum from a NRNST. Her pregnancy was complicated by late to care in 3rd trimester, iron-deficiency anemia (HgB 8), anti-lianna Ab, non-immune Hep B. Labor and delivery complicated by C/S for NRFHT, chorioamnionitis, blood-loss anemia requiring transfusion (6.7>7.8).    Lochia: a little bit of brown discharge still   Pain: burning around incision - a little red  Baby: good - had a question about baby  struggling a bit with stools - pushing, smells - now more green colored, since more BF (1-2x)  going to [facility] peds  Sexual activity: not active  Family planning: no plan right now   Mood: doing well   Feeding: both formula and breastfeeding  giving Similac 360, has WIC (got card) Similac Advance/360   Support from FOB/family: yes     I have reviewed the patients

## 2024-01-18 NOTE — TELEPHONE ENCOUNTER
Contacted patient regarding need for WIC services. Patient is already enrolled but will need to add  to her WIC case. Provided patient with [facility] contact information ([phone]). SW also assisted patient by contacting WIC office and leaving a message for them to contact patient.     Patient reports no additional need. Patient advised to contact clinic SW if she needs additional assistance.     SACHA Lema Navigator      ----- Message from Kenyetta Dixon DO sent at 2024 12:02 PM EST -----  Regarding: postpartum - WIC  Favio Graves,    Would you mind reaching out to this patient? I just saw her for her postpartum visit. Has WIC but doesn't sound like has made appt yet. I think somewhat limited social support, partner previously not involved, first baby.     Thanks!  Kenyetta

## 2024-01-19 LAB
ERYTHROCYTE [DISTWIDTH] IN BLOOD BY AUTOMATED COUNT: 19.6 % (ref 11.7–15.4)
HCT VFR BLD AUTO: 36.4 % (ref 34–46.6)
HGB BLD-MCNC: 11.1 G/DL (ref 11.1–15.9)
MCH RBC QN AUTO: 22.7 PG (ref 26.6–33)
MCHC RBC AUTO-ENTMCNC: 30.5 G/DL (ref 31.5–35.7)
MCV RBC AUTO: 74 FL (ref 79–97)
PLATELET # BLD AUTO: 410 X10E3/UL (ref 150–450)
RBC # BLD AUTO: 4.9 X10E6/UL (ref 3.77–5.28)
WBC # BLD AUTO: 5.6 X10E3/UL (ref 3.4–10.8)

## 2024-01-20 PROBLEM — O09.90 SUPERVISION OF HIGH RISK PREGNANCY, ANTEPARTUM: Status: ACTIVE | Noted: 2024-01-20
